# Patient Record
Sex: FEMALE | Race: WHITE | NOT HISPANIC OR LATINO | Employment: FULL TIME | URBAN - METROPOLITAN AREA
[De-identification: names, ages, dates, MRNs, and addresses within clinical notes are randomized per-mention and may not be internally consistent; named-entity substitution may affect disease eponyms.]

---

## 2020-07-18 ENCOUNTER — HOSPITAL ENCOUNTER (EMERGENCY)
Facility: HOSPITAL | Age: 30
Discharge: HOME | End: 2020-07-18
Attending: EMERGENCY MEDICINE | Admitting: EMERGENCY MEDICINE

## 2020-07-18 ENCOUNTER — APPOINTMENT (OUTPATIENT)
Dept: GENERAL RADIOLOGY | Facility: HOSPITAL | Age: 30
End: 2020-07-18

## 2020-07-18 VITALS
SYSTOLIC BLOOD PRESSURE: 138 MMHG | TEMPERATURE: 96.7 F | BODY MASS INDEX: 30.78 KG/M2 | HEIGHT: 70 IN | OXYGEN SATURATION: 99 % | DIASTOLIC BLOOD PRESSURE: 86 MMHG | HEART RATE: 6 BPM | WEIGHT: 215 LBS | RESPIRATION RATE: 16 BRPM

## 2020-07-18 DIAGNOSIS — S81.812A LACERATION OF LEFT CALF: Primary | ICD-10-CM

## 2020-07-18 PROCEDURE — 90715 TDAP VACCINE 7 YRS/> IM: CPT | Performed by: NURSE PRACTITIONER

## 2020-07-18 PROCEDURE — 90471 IMMUNIZATION ADMIN: CPT | Performed by: NURSE PRACTITIONER

## 2020-07-18 PROCEDURE — 99282 EMERGENCY DEPT VISIT SF MDM: CPT

## 2020-07-18 PROCEDURE — 73590 X-RAY EXAM OF LOWER LEG: CPT

## 2020-07-18 PROCEDURE — 25010000002 TDAP 5-2.5-18.5 LF-MCG/0.5 SUSPENSION: Performed by: NURSE PRACTITIONER

## 2020-07-18 RX ORDER — LIDOCAINE HYDROCHLORIDE AND EPINEPHRINE 10; 10 MG/ML; UG/ML
10 INJECTION, SOLUTION INFILTRATION; PERINEURAL ONCE
Status: COMPLETED | OUTPATIENT
Start: 2020-07-18 | End: 2020-07-18

## 2020-07-18 RX ORDER — CEPHALEXIN 500 MG/1
500 CAPSULE ORAL 3 TIMES DAILY
Qty: 15 CAPSULE | Refills: 0 | Status: SHIPPED | OUTPATIENT
Start: 2020-07-18

## 2020-07-18 RX ADMIN — LIDOCAINE HYDROCHLORIDE,EPINEPHRINE BITARTRATE 10 ML: 10; .01 INJECTION, SOLUTION INFILTRATION; PERINEURAL at 12:28

## 2020-07-18 RX ADMIN — TETANUS TOXOID, REDUCED DIPHTHERIA TOXOID AND ACELLULAR PERTUSSIS VACCINE, ADSORBED 0.5 ML: 5; 2.5; 8; 8; 2.5 SUSPENSION INTRAMUSCULAR at 12:57

## 2020-07-18 NOTE — ED PROVIDER NOTES
Subjective   29-year-old female with no significant past medical history here for chief complaint of laceration to the left tib-fib.  History is obtained from the patient.  The patient states that she works as a  and was taking garbage out when she caught her self yesterday.  She states that she did have one gin and tonic yesterday.  Patient denies any other injuries.  She states that she has been walking on it.  She states that she dressed it and then thought that she might not need sutures therefore she wait until this morning.  Patient denies any other injuries.  She denies hitting her head.  She denies any falls.  She denies being on any blood thinners.  Patient states that her pain is mild, does not radiate, located in the left calf where she suffered the laceration, nothing makes better, nothing makes it worse, sharp.  Patient denies any headaches, neck pain, sore throat, runny nose, cough, cold, chest pain, shortness of breath, abdominal pain, nausea, vomiting, diarrhea, fevers, chills, rashes, urinary symptoms, or any other symptoms.          Review of Systems   All other systems reviewed and are negative.      History reviewed. No pertinent past medical history.    No Known Allergies    Past Surgical History:   Procedure Laterality Date   • TOE AMPUTATION Left     as an infant   • WISDOM TOOTH EXTRACTION         History reviewed. No pertinent family history.    Social History     Socioeconomic History   • Marital status: Single     Spouse name: Not on file   • Number of children: Not on file   • Years of education: Not on file   • Highest education level: Not on file   Tobacco Use   • Smoking status: Current Every Day Smoker     Types: Electronic Cigarette   • Smokeless tobacco: Never Used   • Tobacco comment: Used to smoke regular cigarettes and switched to the e-cig.   Substance and Sexual Activity   • Alcohol use: Yes     Alcohol/week: 12.0 standard drinks     Types: 12 Standard drinks or  equivalent per week     Comment: 2-3 x per week   • Drug use: Yes     Frequency: 2.0 times per week     Types: Marijuana   • Sexual activity: Defer           Objective   Physical Exam   Constitutional: She is oriented to person, place, and time. She appears well-developed and well-nourished. No distress.   HENT:   Head: Normocephalic and atraumatic.   Right Ear: External ear normal.   Left Ear: External ear normal.   Nose: Nose normal.   Mouth/Throat: Oropharynx is clear and moist. No oropharyngeal exudate.   Eyes: Pupils are equal, round, and reactive to light. Conjunctivae and EOM are normal. Right eye exhibits no discharge. Left eye exhibits no discharge. No scleral icterus.   Neck: Normal range of motion. No tracheal deviation present.   Cardiovascular: Normal rate, regular rhythm, normal heart sounds and intact distal pulses. Exam reveals no gallop and no friction rub.   No murmur heard.  Pulmonary/Chest: Effort normal and breath sounds normal. No stridor. No respiratory distress. She has no wheezes. She has no rales. She exhibits no tenderness.   Abdominal: Soft. Bowel sounds are normal. She exhibits no distension. There is no tenderness. There is no rebound and no guarding.   Musculoskeletal: Normal range of motion. She exhibits no edema, tenderness or deformity.   2+ pedal pulses in bilateral lower extremities, normal sensory motor exam of bilateral lower extremities, soft compartments of bilateral lower extremities   Neurological: She is alert and oriented to person, place, and time. No cranial nerve deficit or sensory deficit. She exhibits normal muscle tone.   Skin: Skin is warm and dry. Capillary refill takes less than 2 seconds. No rash noted. She is not diaphoretic. No erythema. No pallor.   Approximately 4 inch laceration on the left calf, bleeding well controlled   Psychiatric: She has a normal mood and affect.   Vitals reviewed.      Procedures           ED Course  ED Course as of Jul 18 1522   Sat  Jul 18, 2020   7696 MDM: The patient presents with laceration of delayed onset and presentation, occurring 12 to 14 hours prior to arrival.  She cut it on a shard of glass while throwing a trash bag away.  There is concern for retained foreign body on x-ray however there is no tenderness in her calf surrounding the laceration margins.  There is no visualized foreign body in the wound is superficial.  The wound was probed and irrigated and there is no tract that is visualized or palpable to suggest deep tissue retained foreign body.  Discussed concern for possible retained foreign body although I feel this is less likely.  She will be given close return precautions for signs of infection.  The wound was closed loosely secondary to this.She will be placed on antibiotics prophylactically given the delay in her presentation.      [JS]      ED Course User Index  [JS] Chantel Avelar, DORCAS                                           MDM  Number of Diagnoses or Management Options  Laceration of left calf:      Amount and/or Complexity of Data Reviewed  Tests in the radiology section of CPT®: ordered and reviewed    Risk of Complications, Morbidity, and/or Mortality  General comments: When I first saw the patient, the patient appeared nontoxic.  Patient was stable.  Patient history and physical exam, I did not think that the patient needed IV or any lab work.  Patient's tetanus status was not up-to-date and she was given a Tdap today.  Patient did get an x-ray of the left tib-fib to rule out foreign body, this was negative.  The laceration was repaired.  Patient was neurovascularly intact in the left lower extremity.  Patient had soft compartments in the left lower extremity.  Please refer to the procedure note for laceration repair for further details.  Patient tolerated the procedure well and had no issues.  Patient was put on p.o. antibiotics given that she waited 12 hours to get the laceration.  The x-ray that  showed that the patient might have a foreign body.  We did probe and washout the laceration.  We did not see any evidence of a foreign body or piece of glass.  The patient was made aware that there could be a potential piece of glass and to follow-up with her PCP for wound check.  We also went over that there is a chance that this wound might get infected because we were not able to get the foreign body out.  Patient had capacity make decisions and understood the risks and was in agreement that she did not want us digging in her calf today.  We went over the risks of potential infection and patient was given strict return precautions including any fevers, redness around the laceration, yellow drainage, or any other concerning symptoms.  I did go over the fact that she should not get the laceration wet for the next 24 to 48 hours.  She was told that she will need the sutures out in about 7 days.  She was told that she could either come to the ED or follow-up with PCP for wound check in 48 hours.  All questions were answered.  Patient was discharged in good condition.        Final diagnoses:   Laceration of left calf            Tigre Cisneros MD  07/18/20 1521

## 2020-07-18 NOTE — DISCHARGE INSTRUCTIONS
Antibiotics as directed   Keep wound clean and dry  Apply bacitracin or polysporin two times daily  No tub baths or swimming until stitches removed and wound well healed.   Stitches to be removed in 7-10 days. You may return to ED or go to your pcp office or the immediate care center.   Return for signs of infection including worsening pain, swelling, redness, warmth to the skin, or thick drainage from the wound.

## 2020-07-18 NOTE — ED PROVIDER NOTES
EMERGENCY DEPARTMENT ENCOUNTER    Room Number:  36/36  Date of encounter:  7/18/2020  PCP: Provider, No Known  Historian: Patient   Full history not obtainable due to: none     HPI:  Chief Complaint: Left leg pain, laceration     Context: Danyell Quezada is a 29 y.o. female who presents to the ED c/o left leg pain onset last p.m.  She reports at 11 PM she was taking out a bag of trash at her work when a glass bottle inadvertently cut her left leg.  Pain is been mild and intermittent and nonradiating in the left calf.  Worse with walking.  Associated laceration which she was able to control the bleeding last night and she did not think she needed stitches.  However this morning she reports that it started bleeding again and she presents to the ER for evaluation.    Tdap not up-to-date in last 5 years.  Last menstrual period current    PAST MEDICAL HISTORY    Active Ambulatory Problems     Diagnosis Date Noted   • No Active Ambulatory Problems     Resolved Ambulatory Problems     Diagnosis Date Noted   • No Resolved Ambulatory Problems     No Additional Past Medical History         PAST SURGICAL HISTORY  Past Surgical History:   Procedure Laterality Date   • TOE AMPUTATION Left     as an infant   • WISDOM TOOTH EXTRACTION           FAMILY HISTORY  History reviewed. No pertinent family history.      SOCIAL HISTORY  Social History     Socioeconomic History   • Marital status: Single     Spouse name: Not on file   • Number of children: Not on file   • Years of education: Not on file   • Highest education level: Not on file   Tobacco Use   • Smoking status: Current Every Day Smoker     Types: Electronic Cigarette   • Smokeless tobacco: Never Used   • Tobacco comment: Used to smoke regular cigarettes and switched to the e-cig.   Substance and Sexual Activity   • Alcohol use: Yes     Alcohol/week: 12.0 standard drinks     Types: 12 Standard drinks or equivalent per week     Comment: 2-3 x per week   • Drug use: Yes      Frequency: 2.0 times per week     Types: Marijuana   • Sexual activity: Defer         ALLERGIES  Patient has no known allergies.        REVIEW OF SYSTEMS  Review of Systems   All systems reviewed and marked as negative except as listed in HPI       PHYSICAL EXAM    I have reviewed the triage vital signs and nursing notes.    ED Triage Vitals   Temp Heart Rate Resp BP SpO2   07/18/20 1159 07/18/20 1159 07/18/20 1159 07/18/20 1215 07/18/20 1159   96.7 °F (35.9 °C) 87 18 125/89 99 %      Temp src Heart Rate Source Patient Position BP Location FiO2 (%)   07/18/20 1159 07/18/20 1159 07/18/20 1215 07/18/20 1215 --   Tympanic Monitor Lying Right arm        GENERAL: Alert well developed, well nourished in no distress  HENT: NCAT, neck supple, trachea midline  EYES: no scleral icterus, PERRL, normal conjunctiva  CV: regular rhythm, regular rate, no murmur  RESPIRATORY: unlabored effort, CTAB  ABDOMEN: soft, non-tender, non-distended, bowel sounds present  MUSCULOSKELETAL: no gross deformity  NEURO: alert,  sensory and motor function of extremities grossly intact, speech clear, mental status normal/baseline  SKIN: warm, dry, no rash 5 cm laceration linear horizontally oriented to the mid left calf.  No tenderness of the wound.  No bony tenderness  PSYCH:  Appropriate mood and affect    Vital signs and nursing notes reviewed.          LAB RESULTS  No results found for this or any previous visit (from the past 24 hour(s)).    Ordered the above labs and independently reviewed the results.        RADIOLOGY  Xr Tibia Fibula 2 View Left    Result Date: 7/18/2020  LEFT TIBIA/FIBULA  HISTORY: Evaluate for foreign body, glass in the posterior left shin.  LEFT TIBIA/FIBULA AP AND LATERAL: There is a horizontal tract of lucency posterolateral to the fibular shaft measuring 3 cm AP by 3 cm transverse. This is just deep to the site of arrow pointing to the site of entry wound. On the lateral view, along the deep margin of this tract there  is a 6 mm focus of increased density that is suspicious for a deep retained foreign body or glass fragment. No fracture or osseous abnormality is evident.      Posterolateral calf soft tissue wound with horizontal tract of gas extending to the 6 mm focus of increased density suspicious for a foreign body/glass fragment, approximately 3 cm deep to the skin surface as demonstrated on the lateral view.  This report was finalized on 7/18/2020 1:51 PM by Dr. Michael Dent M.D.        I ordered the above noted radiological studies. Independently reviewed by me and discussed with radiologist.  See dictation above for official radiology interpretation.      PROCEDURES    Laceration Repair  Date/Time: 7/18/2020 3:02 PM  Performed by: Chantel Avelar APRN  Authorized by: Tigre Cisneros MD     Consent:     Consent obtained:  Verbal    Consent given by:  Patient    Risks discussed:  Infection, pain, poor cosmetic result, poor wound healing and need for additional repair  Anesthesia (see MAR for exact dosages):     Anesthesia method:  Local infiltration    Local anesthetic:  Lidocaine 1% WITH epi  Laceration details:     Location:  Leg    Leg location:  L lower leg    Length (cm):  5  Repair type:     Repair type:  Complex  Pre-procedure details:     Preparation:  Patient was prepped and draped in usual sterile fashion and imaging obtained to evaluate for foreign bodies  Exploration:     Hemostasis achieved with:  Epinephrine and LET    Wound exploration: wound explored through full range of motion and entire depth of wound probed and visualized      Contaminated: no    Treatment:     Area cleansed with:  Betadine and saline    Amount of cleaning:  Extensive    Irrigation solution:  Sterile saline    Irrigation method:  Syringe    Visualized foreign bodies/material removed: no      Debridement:  None    Undermining:  None    Scar revision: yes    Skin repair:     Repair method:  Sutures    Suture size:  4-0    Suture  material:  Prolene    Suture technique:  Simple interrupted    Number of sutures:  5  Approximation:     Approximation:  Loose  Post-procedure details:     Dressing:  Antibiotic ointment and bulky dressing    Patient tolerance of procedure:  Tolerated well, no immediate complications  Comments:      There is concern for retained foreign body on x-ray.  The wound was extensively irrigated and probed.  There is no tract to suggest deep foreign body in the soft tissues.  There is no tenderness or pain of the periwound margins.  I do not believe she has retained foreign body based on her exam.  She has been given return precautions for swelling, pain, drainage or erythema warmth to the wound.  She is been placed prophylactically on antibiotics due to wound delayed closure as this happened about 14 hours prior to arrival.            MEDICATIONS GIVEN IN ER    Medications   Tdap (BOOSTRIX) injection 0.5 mL (0.5 mL Intramuscular Given 7/18/20 1257)   lidocaine-EPINEPHrine (XYLOCAINE W/EPI) 1 %-1:288122 injection 10 mL (10 mL Injection Given by Other 7/18/20 1228)         PROGRESS, DATA ANALYSIS, CONSULTS, AND MEDICAL DECISION MAKING    All labs have been independently reviewed by me.  All radiology studies have been reviewed by me.   EKG's independently reviewed by me.  Discussion below represents my analysis of pertinent findings related to patient's condition, differential diagnosis, treatment plan and final disposition.      ED Course as of Jul 18 1507   Sat Jul 18, 2020   1505 MDM: The patient presents with laceration of delayed onset and presentation, occurring 12 to 14 hours prior to arrival.  She cut it on a shard of glass while throwing a trash bag away.  There is concern for retained foreign body on x-ray however there is no tenderness in her calf surrounding the laceration margins.  There is no visualized foreign body in the wound is superficial.  The wound was probed and irrigated and there is no tract that is  visualized or palpable to suggest deep tissue retained foreign body.  Discussed concern for possible retained foreign body although I feel this is less likely.  She will be given close return precautions for signs of infection.  The wound was closed loosely secondary to this.She will be placed on antibiotics prophylactically given the delay in her presentation.      [JS]      ED Course User Index  [JS] Chantel Avelar APRN       AS OF 15:07 VITALS:    BP - 125/89  HR - 76  TEMP - 96.7 °F (35.9 °C) (Tympanic)  02 SATS - 99%        DIAGNOSIS  Final diagnoses:   Laceration of left calf         DISPOSITION  Discharge     Pt masked in first look. I wore a surgical mask throughout my encounters with the pt. I performed hand hygiene on entry into the pt room and upon exit.        Chantel Avelar, DORCAS  07/18/20 1646

## 2020-07-18 NOTE — ED NOTES
Pt states she was taking out garbage last night while she was at work when a piece of glass from the garbage bag cut into her L leg.  Pt states the bleeding was able to be stopped last night but this morning it started to bleed again and it is unable to be controlled.    Patient in surgical mask.    This nurse in gloves, goggles and surgical mask.      Pham Suarez, RN  07/18/20 1856